# Patient Record
Sex: FEMALE | Race: WHITE | Employment: OTHER | RURAL
[De-identification: names, ages, dates, MRNs, and addresses within clinical notes are randomized per-mention and may not be internally consistent; named-entity substitution may affect disease eponyms.]

---

## 2017-02-27 ENCOUNTER — TELEPHONE (OUTPATIENT)
Dept: FAMILY MEDICINE CLINIC | Age: 82
End: 2017-02-27

## 2017-02-27 NOTE — TELEPHONE ENCOUNTER
I spoke with Darrell Hernandez to offer condolences to the family. Death certificate will go to [facility] Dr. Chaz Cuellar.